# Patient Record
Sex: MALE | Race: WHITE | Employment: OTHER | URBAN - METROPOLITAN AREA
[De-identification: names, ages, dates, MRNs, and addresses within clinical notes are randomized per-mention and may not be internally consistent; named-entity substitution may affect disease eponyms.]

---

## 2023-10-12 ENCOUNTER — APPOINTMENT (OUTPATIENT)
Dept: PHARMACY | Facility: CLINIC | Age: 78
End: 2023-10-12
Payer: MEDICARE

## 2023-10-12 ENCOUNTER — ANTICOAGULATION - WARFARIN VISIT (OUTPATIENT)
Dept: PHARMACY | Facility: CLINIC | Age: 78
End: 2023-10-12
Payer: MEDICARE

## 2023-10-12 DIAGNOSIS — Z95.2 HISTORY OF PROSTHETIC HEART VALVE: Primary | ICD-10-CM

## 2023-10-12 LAB
POC INR: 3.8
POC PROTHROMBIN TIME: NORMAL

## 2023-10-12 PROCEDURE — 85610 PROTHROMBIN TIME: CPT | Performed by: PHARMACIST

## 2023-10-12 RX ORDER — FENOFIBRATE 160 MG/1
TABLET ORAL
COMMUNITY
Start: 2023-07-31

## 2023-10-12 RX ORDER — METFORMIN HYDROCHLORIDE 500 MG/1
TABLET ORAL
COMMUNITY
Start: 2023-10-08

## 2023-10-12 RX ORDER — LANCETS 33 GAUGE
EACH MISCELLANEOUS
COMMUNITY
Start: 2023-09-07

## 2023-10-12 RX ORDER — BLOOD-GLUCOSE METER
EACH MISCELLANEOUS
COMMUNITY
Start: 2023-09-07

## 2023-10-12 RX ORDER — MIRTAZAPINE 15 MG/1
TABLET, FILM COATED ORAL
COMMUNITY
Start: 2023-10-08

## 2023-10-12 RX ORDER — LOSARTAN POTASSIUM 50 MG/1
TABLET ORAL
COMMUNITY
Start: 2023-10-02

## 2023-10-12 RX ORDER — WARFARIN SODIUM 5 MG/1
TABLET ORAL
COMMUNITY
Start: 2023-08-27

## 2023-10-12 RX ORDER — ATORVASTATIN CALCIUM 20 MG/1
TABLET, FILM COATED ORAL
COMMUNITY
Start: 2023-08-27

## 2023-10-12 RX ORDER — GLIMEPIRIDE 2 MG/1
TABLET ORAL
COMMUNITY
Start: 2023-08-27

## 2023-10-12 RX ORDER — METOPROLOL TARTRATE 100 MG/1
TABLET ORAL
COMMUNITY
Start: 2023-08-27

## 2023-10-12 RX ORDER — ATORVASTATIN CALCIUM 10 MG/1
TABLET, FILM COATED ORAL
COMMUNITY
Start: 2022-11-27

## 2023-10-12 NOTE — PROGRESS NOTES
Coumadin Clinic Visit Note    Patient verified warfarin dose  No missed doses  No unusual bruising or bleeding  No changes to medications  Consistent dietary green intake  No anticipated procedures at this time  INR Supratherapeutic today at 3.8  Had less greens this week.  Pt took dose today  Take only 5mg tomorrow  Next appointment 4 weeks      Juana Crowley, EstelleD

## 2023-11-09 ENCOUNTER — ANTICOAGULATION - WARFARIN VISIT (OUTPATIENT)
Dept: PHARMACY | Facility: CLINIC | Age: 78
End: 2023-11-09
Payer: MEDICARE

## 2023-11-09 DIAGNOSIS — Z95.2 HISTORY OF PROSTHETIC HEART VALVE: Primary | ICD-10-CM

## 2023-11-09 LAB
POC INR: 2.7
POC PROTHROMBIN TIME: NORMAL

## 2023-11-09 PROCEDURE — 85610 PROTHROMBIN TIME: CPT | Performed by: PHARMACIST

## 2023-11-09 NOTE — PROGRESS NOTES
Verified current dose with pt.  No new meds or med changes since last visit.  Pt denies unusual bleed/bruise.  No upcoming procedures.  Inr = 2.7  Continue same dose and check again in 5 weeks.

## 2023-12-14 ENCOUNTER — ANTICOAGULATION - WARFARIN VISIT (OUTPATIENT)
Dept: PHARMACY | Facility: CLINIC | Age: 78
End: 2023-12-14
Payer: MEDICARE

## 2023-12-14 DIAGNOSIS — Z95.2 HISTORY OF PROSTHETIC HEART VALVE: Primary | ICD-10-CM

## 2023-12-14 LAB
POC INR: 3.5
POC PROTHROMBIN TIME: NORMAL

## 2023-12-14 PROCEDURE — 85610 PROTHROMBIN TIME: CPT | Mod: QW | Performed by: PHARMACIST

## 2023-12-14 PROCEDURE — 99212 OFFICE O/P EST SF 10 MIN: CPT | Performed by: PHARMACIST

## 2023-12-14 NOTE — PROGRESS NOTES
Verified current dose with pt.    No new meds or med changes since last visit.  Pt denies unusual bleed/bruise.  No upcoming procedures.  Inr = 3.5  Continue same dose and check again in 5 weeks.

## 2023-12-18 ENCOUNTER — LAB (OUTPATIENT)
Dept: LAB | Facility: LAB | Age: 78
End: 2023-12-18
Payer: MEDICARE

## 2023-12-18 DIAGNOSIS — E78.5 HYPERLIPIDEMIA, UNSPECIFIED: Primary | ICD-10-CM

## 2023-12-18 LAB
CHOLEST SERPL-MCNC: 158 MG/DL (ref 0–199)
CHOLESTEROL/HDL RATIO: 5
HDLC SERPL-MCNC: 31.3 MG/DL
LDLC SERPL CALC-MCNC: 77 MG/DL
NON HDL CHOLESTEROL: 127 MG/DL (ref 0–149)
TRIGL SERPL-MCNC: 248 MG/DL (ref 0–149)
VLDL: 50 MG/DL (ref 0–40)

## 2023-12-18 PROCEDURE — 36415 COLL VENOUS BLD VENIPUNCTURE: CPT

## 2023-12-18 PROCEDURE — 80061 LIPID PANEL: CPT

## 2024-01-18 ENCOUNTER — ANTICOAGULATION - WARFARIN VISIT (OUTPATIENT)
Dept: PHARMACY | Facility: CLINIC | Age: 79
End: 2024-01-18
Payer: MEDICARE

## 2024-01-18 ENCOUNTER — CLINICAL SUPPORT (OUTPATIENT)
Dept: PHARMACY | Facility: CLINIC | Age: 79
End: 2024-01-18
Payer: MEDICARE

## 2024-01-18 DIAGNOSIS — Z95.2 HISTORY OF PROSTHETIC HEART VALVE: Primary | ICD-10-CM

## 2024-01-18 LAB
POC INR: 4.1
POC PROTHROMBIN TIME: NORMAL

## 2024-01-18 PROCEDURE — 85610 PROTHROMBIN TIME: CPT | Mod: QW | Performed by: PHARMACIST

## 2024-01-18 PROCEDURE — 99212 OFFICE O/P EST SF 10 MIN: CPT | Performed by: PHARMACIST

## 2024-01-18 NOTE — PROGRESS NOTES
Coumadin Clinic Visit Note    Patient verified warfarin dose  No missed doses  No unusual bruising or bleeding  No changes to medications  Consistent dietary green intake  No anticipated procedures at this time  INR Supratherapeutic today at 4.1  Hold warfarin today  Pt did not have enough greens lately.  Will have more now.  Next appointment 1 week.      Juana Crowley, PharmD

## 2024-01-25 ENCOUNTER — ANTICOAGULATION - WARFARIN VISIT (OUTPATIENT)
Dept: PHARMACY | Facility: CLINIC | Age: 79
End: 2024-01-25
Payer: MEDICARE

## 2024-01-25 ENCOUNTER — CLINICAL SUPPORT (OUTPATIENT)
Dept: PHARMACY | Facility: CLINIC | Age: 79
End: 2024-01-25
Payer: MEDICARE

## 2024-01-25 DIAGNOSIS — Z95.2 HISTORY OF PROSTHETIC HEART VALVE: Primary | ICD-10-CM

## 2024-01-25 LAB
POC INR: 2.6
POC INR: 2.6
POC PROTHROMBIN TIME: NORMAL
POC PROTHROMBIN TIME: NORMAL

## 2024-01-25 PROCEDURE — 99212 OFFICE O/P EST SF 10 MIN: CPT | Performed by: PHARMACIST

## 2024-01-25 PROCEDURE — 85610 PROTHROMBIN TIME: CPT | Mod: QW | Performed by: PHARMACIST

## 2024-01-25 NOTE — PROGRESS NOTES
Robinson Hetlon is a 78 y.o. male with history of History of prosthetic heart valve  who presents today for anticoagulation monitoring and adjustment.  INR 2.6 is therapeutic for this patient (goal range 2.5-3.5) and is reflective of 37.5 mg TWD for 1 week due to high INR last week  Patient verifies current dosing regimen, patient able to verbally recall dose  Patient reports no  missed doses since last INR  Last INR 4.1 on 1/18/24 (1 week interval)  Patient denies s/sx clotting and/or stroke  Patient denies hematuria, epistaxis, rectal bleeding  Patient denies changes in diet, alcohol, or tobacco use  Vegetable intake consistent from week to week  Reviewed medication list and drug allergies with patient, updated any medication additions or modifications accordingly  Acetaminophen intake: no changes   Patient also denies any pending medical or dental procedures scheduled at this time  Patient was instructed to continue current TWD of 40mg  and RTC 5 weeks    Lindsay Gillis, EstelleD, BCPS   1/25/2024 10:55 AM

## 2024-02-29 ENCOUNTER — ANTICOAGULATION - WARFARIN VISIT (OUTPATIENT)
Dept: PHARMACY | Facility: CLINIC | Age: 79
End: 2024-02-29
Payer: MEDICARE

## 2024-02-29 ENCOUNTER — CLINICAL SUPPORT (OUTPATIENT)
Dept: PHARMACY | Facility: CLINIC | Age: 79
End: 2024-02-29
Payer: MEDICARE

## 2024-02-29 DIAGNOSIS — Z95.2 HISTORY OF PROSTHETIC HEART VALVE: Primary | ICD-10-CM

## 2024-02-29 LAB
POC INR: 3.3
POC PROTHROMBIN TIME: NORMAL

## 2024-02-29 PROCEDURE — 99212 OFFICE O/P EST SF 10 MIN: CPT | Performed by: PHARMACIST

## 2024-02-29 PROCEDURE — 85610 PROTHROMBIN TIME: CPT | Mod: QW | Performed by: PHARMACIST

## 2024-02-29 NOTE — PROGRESS NOTES
Verified current dose with pt.    No new meds or med changes since last visit.  Pt denies unusual bleed/bruise.  No upcoming procedures.  Inr = 3.3  Continue same dose and check again in 5 weeks.

## 2024-04-04 ENCOUNTER — ANTICOAGULATION - WARFARIN VISIT (OUTPATIENT)
Dept: PHARMACY | Facility: CLINIC | Age: 79
End: 2024-04-04
Payer: MEDICARE

## 2024-04-04 DIAGNOSIS — Z95.2 HISTORY OF PROSTHETIC HEART VALVE: Primary | ICD-10-CM

## 2024-04-04 LAB
POC INR: 2.8
POC PROTHROMBIN TIME: NORMAL

## 2024-04-04 PROCEDURE — 85610 PROTHROMBIN TIME: CPT | Mod: QW

## 2024-04-04 PROCEDURE — 99212 OFFICE O/P EST SF 10 MIN: CPT

## 2024-04-04 NOTE — PROGRESS NOTES
Coumadin Clinic Visit Note    Patient verified warfarin dose  No missed doses  No unusual bruising or bleeding  No changes to medications  Patient did start taking some Vitamin C but started a few months ago  Consistent dietary green intake  No anticipated procedures at this time  Patient has INR range to 2.5 to 3.5  INR Therapeutic today at 2.8  No changes to warfarin dose today  Next appointment 5 weeks    Dulce Maria Patterson, Pharm D

## 2024-05-09 ENCOUNTER — ANTICOAGULATION - WARFARIN VISIT (OUTPATIENT)
Dept: PHARMACY | Facility: CLINIC | Age: 79
End: 2024-05-09
Payer: MEDICARE

## 2024-05-09 DIAGNOSIS — Z95.2 HISTORY OF PROSTHETIC HEART VALVE: Primary | ICD-10-CM

## 2024-05-09 LAB
POC INR: 2.8
POC PROTHROMBIN TIME: NORMAL

## 2024-05-09 PROCEDURE — 99212 OFFICE O/P EST SF 10 MIN: CPT

## 2024-05-09 PROCEDURE — 85610 PROTHROMBIN TIME: CPT | Mod: QW

## 2024-05-09 NOTE — PROGRESS NOTES
No bleeding or unusual bruising.  Medications and doses verified.  No scheduled procedures at this time.  INR=2.8   Plan: Continue same weekly dose.  Follow up INR check in 5 weeks.

## 2024-06-04 ENCOUNTER — LAB (OUTPATIENT)
Dept: LAB | Facility: LAB | Age: 79
End: 2024-06-04
Payer: MEDICARE

## 2024-06-04 DIAGNOSIS — R31.9 HEMATURIA, UNSPECIFIED: Primary | ICD-10-CM

## 2024-06-04 LAB
ANION GAP SERPL CALC-SCNC: 11 MMOL/L (ref 10–20)
BASOPHILS # BLD AUTO: 0.07 X10*3/UL (ref 0–0.1)
BASOPHILS NFR BLD AUTO: 1.4 %
BUN SERPL-MCNC: 29 MG/DL (ref 6–23)
CALCIUM SERPL-MCNC: 9.8 MG/DL (ref 8.6–10.3)
CHLORIDE SERPL-SCNC: 105 MMOL/L (ref 98–107)
CO2 SERPL-SCNC: 29 MMOL/L (ref 21–32)
CREAT SERPL-MCNC: 1.18 MG/DL (ref 0.5–1.3)
EGFRCR SERPLBLD CKD-EPI 2021: 64 ML/MIN/1.73M*2
EOSINOPHIL # BLD AUTO: 0.14 X10*3/UL (ref 0–0.4)
EOSINOPHIL NFR BLD AUTO: 2.7 %
ERYTHROCYTE [DISTWIDTH] IN BLOOD BY AUTOMATED COUNT: 12.8 % (ref 11.5–14.5)
GLUCOSE SERPL-MCNC: 213 MG/DL (ref 74–99)
HCT VFR BLD AUTO: 45.3 % (ref 41–52)
HGB BLD-MCNC: 15.4 G/DL (ref 13.5–17.5)
IMM GRANULOCYTES # BLD AUTO: 0.02 X10*3/UL (ref 0–0.5)
IMM GRANULOCYTES NFR BLD AUTO: 0.4 % (ref 0–0.9)
LYMPHOCYTES # BLD AUTO: 1.52 X10*3/UL (ref 0.8–3)
LYMPHOCYTES NFR BLD AUTO: 29.8 %
MCH RBC QN AUTO: 33.2 PG (ref 26–34)
MCHC RBC AUTO-ENTMCNC: 34 G/DL (ref 32–36)
MCV RBC AUTO: 98 FL (ref 80–100)
MONOCYTES # BLD AUTO: 0.39 X10*3/UL (ref 0.05–0.8)
MONOCYTES NFR BLD AUTO: 7.6 %
NEUTROPHILS # BLD AUTO: 2.96 X10*3/UL (ref 1.6–5.5)
NEUTROPHILS NFR BLD AUTO: 58.1 %
NRBC BLD-RTO: 0 /100 WBCS (ref 0–0)
PLATELET # BLD AUTO: 222 X10*3/UL (ref 150–450)
POTASSIUM SERPL-SCNC: 4.8 MMOL/L (ref 3.5–5.3)
RBC # BLD AUTO: 4.64 X10*6/UL (ref 4.5–5.9)
SODIUM SERPL-SCNC: 140 MMOL/L (ref 136–145)
WBC # BLD AUTO: 5.1 X10*3/UL (ref 4.4–11.3)

## 2024-06-04 PROCEDURE — 36415 COLL VENOUS BLD VENIPUNCTURE: CPT

## 2024-06-04 PROCEDURE — 80048 BASIC METABOLIC PNL TOTAL CA: CPT

## 2024-06-04 PROCEDURE — 85025 COMPLETE CBC W/AUTO DIFF WBC: CPT

## 2024-06-06 ENCOUNTER — HOSPITAL ENCOUNTER (OUTPATIENT)
Dept: RADIOLOGY | Facility: CLINIC | Age: 79
Discharge: HOME | End: 2024-06-06
Payer: MEDICARE

## 2024-06-06 DIAGNOSIS — R31.9 HEMATURIA: ICD-10-CM

## 2024-06-06 PROCEDURE — 2550000001 HC RX 255 CONTRASTS: Performed by: INTERNAL MEDICINE

## 2024-06-06 PROCEDURE — 76377 3D RENDER W/INTRP POSTPROCES: CPT

## 2024-06-06 PROCEDURE — 74178 CT ABD&PLV WO CNTR FLWD CNTR: CPT | Performed by: RADIOLOGY

## 2024-06-06 PROCEDURE — 76376 3D RENDER W/INTRP POSTPROCES: CPT | Performed by: RADIOLOGY

## 2024-06-06 RX ADMIN — IOHEXOL 75 ML: 350 INJECTION, SOLUTION INTRAVENOUS at 10:21

## 2024-06-13 ENCOUNTER — ANTICOAGULATION - WARFARIN VISIT (OUTPATIENT)
Dept: PHARMACY | Facility: CLINIC | Age: 79
End: 2024-06-13
Payer: MEDICARE

## 2024-06-13 ENCOUNTER — TELEPHONE (OUTPATIENT)
Dept: SCHEDULING | Age: 79
End: 2024-06-13

## 2024-06-13 DIAGNOSIS — Z95.2 HISTORY OF PROSTHETIC HEART VALVE: Primary | ICD-10-CM

## 2024-06-13 LAB
POC INR: 3.4
POC PROTHROMBIN TIME: NORMAL

## 2024-06-13 PROCEDURE — 99212 OFFICE O/P EST SF 10 MIN: CPT | Performed by: PHARMACIST

## 2024-06-13 PROCEDURE — 85610 PROTHROMBIN TIME: CPT | Mod: QW | Performed by: PHARMACIST

## 2024-06-13 NOTE — PROGRESS NOTES
Robinson Helton is a 78 y.o. male with history of prosthetic heart valve who presents today for anticoagulation monitoring and adjustment.  INR 3.4 is therapeutic for this patient (goal range 2.5-3.5) and is reflective of 40 mg TWD  Patient verifies current dosing regimen, patient able to verbally recall dose  Patient reports 0  missed doses since last INR  Last INR 2.8 on 5/9/24 (5 week interval)  Patient denies s/sx clotting and/or stroke  Patient denies hematuria, epistaxis, rectal bleeding  Patient denies changes in diet, alcohol, or tobacco use  Vegetable intake consistent from week to week  Reviewed medication list and drug allergies with patient, updated any medication additions or modifications accordingly  Patient noted some blood in urine - he is following with urology and had an MRI with Dr. Horan -- nothing conclusive as of yet   Acetaminophen intake: no changes   Patient also denies any pending medical or dental procedures scheduled at this time  Patient was instructed to continue with warfarin 40mg TWD and RTC 5 weeks    Lindsay Gillis, PharmD, BCPS   6/13/2024 10:35 AM

## 2024-07-18 ENCOUNTER — ANTICOAGULATION - WARFARIN VISIT (OUTPATIENT)
Dept: PHARMACY | Facility: CLINIC | Age: 79
End: 2024-07-18
Payer: MEDICARE

## 2024-07-18 DIAGNOSIS — Z95.2 HISTORY OF PROSTHETIC HEART VALVE: Primary | ICD-10-CM

## 2024-07-18 LAB
POC INR: 3.1
POC PROTHROMBIN TIME: NORMAL

## 2024-07-18 PROCEDURE — 85610 PROTHROMBIN TIME: CPT | Mod: QW | Performed by: PHARMACIST

## 2024-07-18 PROCEDURE — 99212 OFFICE O/P EST SF 10 MIN: CPT | Performed by: PHARMACIST

## 2024-07-18 NOTE — PROGRESS NOTES
Verified current dose with pt.    No new meds or med changes since last visit.  Pt denies unusual bleed/bruise.  No upcoming procedures.  No missed doses per pt.    Inr = 3.1  Continue same dose and check again in 5 weeks.

## 2024-07-19 ENCOUNTER — APPOINTMENT (OUTPATIENT)
Dept: HEMATOLOGY/ONCOLOGY | Facility: CLINIC | Age: 79
End: 2024-07-19
Payer: MEDICARE

## 2024-07-26 ENCOUNTER — OFFICE VISIT (OUTPATIENT)
Dept: HEMATOLOGY/ONCOLOGY | Facility: CLINIC | Age: 79
End: 2024-07-26
Payer: MEDICARE

## 2024-07-26 ENCOUNTER — APPOINTMENT (OUTPATIENT)
Dept: LAB | Facility: CLINIC | Age: 79
End: 2024-07-26
Payer: MEDICARE

## 2024-07-26 ENCOUNTER — LAB (OUTPATIENT)
Dept: LAB | Facility: CLINIC | Age: 79
End: 2024-07-26
Payer: MEDICARE

## 2024-07-26 VITALS
DIASTOLIC BLOOD PRESSURE: 63 MMHG | HEART RATE: 54 BPM | WEIGHT: 199.63 LBS | TEMPERATURE: 95.5 F | OXYGEN SATURATION: 97 % | RESPIRATION RATE: 20 BRPM | BODY MASS INDEX: 28.9 KG/M2 | SYSTOLIC BLOOD PRESSURE: 129 MMHG

## 2024-07-26 DIAGNOSIS — C49.3: ICD-10-CM

## 2024-07-26 LAB
ALBUMIN SERPL BCP-MCNC: 4.2 G/DL (ref 3.4–5)
ALP SERPL-CCNC: 33 U/L (ref 33–136)
ALT SERPL W P-5'-P-CCNC: 22 U/L (ref 10–52)
ANION GAP SERPL CALC-SCNC: 12 MMOL/L (ref 10–20)
AST SERPL W P-5'-P-CCNC: 32 U/L (ref 9–39)
BASOPHILS # BLD AUTO: 0.06 X10*3/UL (ref 0–0.1)
BASOPHILS NFR BLD AUTO: 1.3 %
BILIRUB SERPL-MCNC: 0.8 MG/DL (ref 0–1.2)
BUN SERPL-MCNC: 26 MG/DL (ref 6–23)
CALCIUM SERPL-MCNC: 9 MG/DL (ref 8.6–10.3)
CHLORIDE SERPL-SCNC: 105 MMOL/L (ref 98–107)
CO2 SERPL-SCNC: 26 MMOL/L (ref 21–32)
CREAT SERPL-MCNC: 0.91 MG/DL (ref 0.5–1.3)
EGFRCR SERPLBLD CKD-EPI 2021: 87 ML/MIN/1.73M*2
EOSINOPHIL # BLD AUTO: 0.16 X10*3/UL (ref 0–0.4)
EOSINOPHIL NFR BLD AUTO: 3.5 %
ERYTHROCYTE [DISTWIDTH] IN BLOOD BY AUTOMATED COUNT: 12.5 % (ref 11.5–14.5)
GLUCOSE SERPL-MCNC: 190 MG/DL (ref 74–99)
HCT VFR BLD AUTO: 43.8 % (ref 41–52)
HGB BLD-MCNC: 15.1 G/DL (ref 13.5–17.5)
IMM GRANULOCYTES # BLD AUTO: 0.01 X10*3/UL (ref 0–0.5)
IMM GRANULOCYTES NFR BLD AUTO: 0.2 % (ref 0–0.9)
LDH SERPL L TO P-CCNC: 174 U/L (ref 84–246)
LYMPHOCYTES # BLD AUTO: 1.47 X10*3/UL (ref 0.8–3)
LYMPHOCYTES NFR BLD AUTO: 32.3 %
MCH RBC QN AUTO: 33.1 PG (ref 26–34)
MCHC RBC AUTO-ENTMCNC: 34.5 G/DL (ref 32–36)
MCV RBC AUTO: 96 FL (ref 80–100)
MONOCYTES # BLD AUTO: 0.36 X10*3/UL (ref 0.05–0.8)
MONOCYTES NFR BLD AUTO: 7.9 %
NEUTROPHILS # BLD AUTO: 2.49 X10*3/UL (ref 1.6–5.5)
NEUTROPHILS NFR BLD AUTO: 54.8 %
NRBC BLD-RTO: 0 /100 WBCS (ref 0–0)
PLATELET # BLD AUTO: 185 X10*3/UL (ref 150–450)
POTASSIUM SERPL-SCNC: 4.1 MMOL/L (ref 3.5–5.3)
PROT SERPL-MCNC: 6.5 G/DL (ref 6.4–8.2)
RBC # BLD AUTO: 4.56 X10*6/UL (ref 4.5–5.9)
SODIUM SERPL-SCNC: 139 MMOL/L (ref 136–145)
WBC # BLD AUTO: 4.6 X10*3/UL (ref 4.4–11.3)

## 2024-07-26 PROCEDURE — 83615 LACTATE (LD) (LDH) ENZYME: CPT | Performed by: INTERNAL MEDICINE

## 2024-07-26 PROCEDURE — 80053 COMPREHEN METABOLIC PANEL: CPT | Performed by: INTERNAL MEDICINE

## 2024-07-26 PROCEDURE — 1159F MED LIST DOCD IN RCRD: CPT | Performed by: INTERNAL MEDICINE

## 2024-07-26 PROCEDURE — 36415 COLL VENOUS BLD VENIPUNCTURE: CPT

## 2024-07-26 PROCEDURE — 85025 COMPLETE CBC W/AUTO DIFF WBC: CPT | Performed by: INTERNAL MEDICINE

## 2024-07-26 PROCEDURE — 1126F AMNT PAIN NOTED NONE PRSNT: CPT | Performed by: INTERNAL MEDICINE

## 2024-07-26 PROCEDURE — 1036F TOBACCO NON-USER: CPT | Performed by: INTERNAL MEDICINE

## 2024-07-26 PROCEDURE — 99213 OFFICE O/P EST LOW 20 MIN: CPT | Performed by: INTERNAL MEDICINE

## 2024-07-26 RX ORDER — ASPIRIN 81 MG/1
81 TABLET ORAL DAILY
COMMUNITY

## 2024-07-26 ASSESSMENT — ENCOUNTER SYMPTOMS
OCCASIONAL FEELINGS OF UNSTEADINESS: 0
LOSS OF SENSATION IN FEET: 0
DEPRESSION: 0

## 2024-07-26 ASSESSMENT — PAIN SCALES - GENERAL: PAINLEVEL: 0-NO PAIN

## 2024-07-26 NOTE — PROGRESS NOTES
LOCATION:  [facility] at [facility].    HEMATOLOGY ONCOLOGY PROBLEMS:    1.  History of liposarcoma involving the left  chest wall.      a.  Status post surgical resection by Dr. Declan Hernandez at [facility] in Aug 2012.      b.  Subsequent further resection of a residual 11 cm sarcoma by Dr. Stoney Gray at [facility].       c.  No history of chemotherapy or radiation therapy.      d.  Currently being followed with close observation.    CHIEF COMPLAINT:     The patient is in the clinic for management of liposarcoma involving the left upper chest wall.    HISTORY:    Mr. 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  The patient has a history of CABG and aorta reconstruction and the aortic valve replacement surgery in 2003.  He was having  regular followup, and during one of the scans, was noted to have a chest wall lesion around 2012.  He had a surgical resection done initially at [facility] by Dr. Declan Hernandez in August 2012 with pathology results confirming liposarcoma.  I do  not have all the details, but as per the patient, there was a finding of a residual, large, 11 cm lesion in the latissimus dorsi muscle.  He subsequently had an additional surgery done by Dr. Stoney Gray at [facility] with removal of 2 ribs  and a portion of the diaphragm and the latissimus dorsi muscle and other tissue.  He never received adjuvant radiation or chemotherapy, and since then, has been followed closely without any evidence of disease recurrence.  Over the years, he has developed  a large incisional/upper abdomen/flank hernia.  As per the patient, he has been advised close symptomatic followup.  He uses a brace regularly.    INTERVAL HISTORY:    He denies any specific new complaints.  No specific history of nausea, vomiting, fever, diarrhea, rash,  anorexia, or weight loss.     PAST MEDICAL  HISTORY:    1.  Coronary artery disease.  2.  Hypertension.  3.  History  of CABG/aortic valve replacement / aortic root reconstruction surgery in .  4.  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   5.  Anxiety/depression.  6.  Diabetes Mellitus type II.  7.  Remote history of tonsillectomy.    SOCIAL HISTORY:    Single and lives alone in [place].  Quit smoking 35 years ago.  Quit  drinking alcohol in .  He is a retired  and used to work for [employer].  Born and raised in [place].    FAMILY HISTORY:     Father  at age 65 from myocardial infarction.  Mother  at age 46 from throat cancer.  The sister  at age 67 from ovarian cancer. He doesn't have any children.     REVIEW OF SYSTEMS:    Pertinent finding as per the history above.   All other systems have been reviewed and generally negative and noncontributory.                                                                     ALLERGY & MEDICATIONS:  Allergies and latest outpatient medications list were reviewed in the EMR.    VITAL SIGNS  BSA: 2.11 meters squared  /63   Pulse 54   Temp 35.3 °C (95.5 °F) (Temporal)   Resp 20   Wt 90.5 kg (199 lb 10 oz)   SpO2 97%   BMI 28.90 kg/m²     PHYSICAL EXAMINATION:  Detailed examination not done.  Large left-sided hernia as before.    LAB RESULTS:  CBC, CMP and LDH were all normal today other than glucose of 190.  Last 3 sets of blood work were reviewed and the trend was noted.     ASSESSMENT & PLAN:  1.  History of localized chest wall liposarcoma. Please refer to the details of initial presentation and management as outlined above. In summary, the patient was noted to have  an incidental finding of a chest wall lesion. He had an initial resection done at [place] but had extensive additional surgery done at [facility] in . There was no need for a adjuvant radiation or chemotherapy, and since then, he has been followed  closely without any evidence of  recurrence. Over the years, he has developed a large left flank hernia extending almost up to the lower axilla.  He is 7 years out from his initial diagnosis, and there is no need for regular followup CT scans at this time.  In the future, if there are any new symptoms or concerns we will check the scans at that time.    His blood work is essentially unremarkable. Patient is reassured that everything is stable. We will continue to follow him closely with regular physical examination  and blood work.    2.  Left flank hernia.  As stated in the previous notes, on physical examination, the patient has a large hernia. As per the patient, the finding has not changed over the years. As before, I am slightly concerned at the size and extent of the hernia. In my opinion, there will be a concern for strangulation or volvulus  or similar complications.  I again advised him to have surgical reevaluation but he is reluctant. As per the patient he is used to this large swelling and he has not noticed any significant change over last 10 years and wants to avoid another big  surgery.    3.  Followup. He will have a followup visit in 12 months. We will check labs (CBC/ CMP)  few days prior to next visit.     This note has been transcribed using Dragon voice recognition system and there is a possibility of unintentional typing misprints.

## 2024-08-16 ENCOUNTER — APPOINTMENT (OUTPATIENT)
Dept: UROLOGY | Facility: CLINIC | Age: 79
End: 2024-08-16
Payer: MEDICARE

## 2024-08-16 VITALS
TEMPERATURE: 97 F | DIASTOLIC BLOOD PRESSURE: 74 MMHG | BODY MASS INDEX: 29.12 KG/M2 | HEART RATE: 61 BPM | SYSTOLIC BLOOD PRESSURE: 148 MMHG | WEIGHT: 196.6 LBS | HEIGHT: 69 IN

## 2024-08-16 DIAGNOSIS — R31.0 GROSS HEMATURIA: Primary | ICD-10-CM

## 2024-08-16 PROCEDURE — 1159F MED LIST DOCD IN RCRD: CPT | Performed by: STUDENT IN AN ORGANIZED HEALTH CARE EDUCATION/TRAINING PROGRAM

## 2024-08-16 PROCEDURE — 1036F TOBACCO NON-USER: CPT | Performed by: STUDENT IN AN ORGANIZED HEALTH CARE EDUCATION/TRAINING PROGRAM

## 2024-08-16 PROCEDURE — 99204 OFFICE O/P NEW MOD 45 MIN: CPT | Performed by: STUDENT IN AN ORGANIZED HEALTH CARE EDUCATION/TRAINING PROGRAM

## 2024-08-16 NOTE — ASSESSMENT & PLAN NOTE
Discussed with the patient that anyone with gross or visible blood in the urine or blood demonstrated on microscopic analysis is referred to Urology for evaluation.  Most of the time, the evaluation does not find a cause, but we do the evaluation to rule out significant causes of blood in the urine including stones, kidney cancer, bladder cancer, UTI, and numerous other conditions.  The evaluation includes an upper tract evaluation which normally is cross sectional imaging of the kidneys and ureters (US, CT or MRI) as well cystoscopy of the lower tract to rule out bladder/urethra pathology.  Additional tests such as urine studies, PSA assessment in males may be ordered as well.

## 2024-08-16 NOTE — PROGRESS NOTES
Subjective   Patient ID: Robinson Helton is a 79 y.o. male who presents for today for gross hematuria in 06/2024.      Experienced gross hematuria in June of 2024. States that it has not happened again but his urine will appear orange sometimes.  He is on warfarin.    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  Scan also showed a kidney stone in the right kidney.     Patient is currently on Warfarin 5mg tablet.     PMHx:  He is on warfarin for a heart valve.  He has a history of liposarcoma and has had surgery in the past.  He has HTN and DM    PSHx:  History of liposarcoma surgery and aortic valve replaced      Social: Tobacco Use: Medium Risk (7/26/2024)      Patient History          Smoking Tobacco Use: Former stopped 5+ years ago. Pack a day.           Smokeless Tobacco Use: Never          Passive Exposure: Past      Family: Cancer-related family history is not on file.               Review of Systems    All systems were reviewed. Anything negative was noted in the HPI.    Objective   Physical Exam  Physical Exam  Gen: No acute distress     Psych: Alert and oriented x3     Neuro:  Normal ROM    Resp: Nonlabored respirations     CV: Regular rate and rhythm     Abd: S, NT, ND.     : Deferred    Skin: Warm, dry and intact without rashes     Lymphatics: No peripheral edema         Assessment/Plan   Problem List Items Addressed This Visit       Gross hematuria - Primary    Current Assessment & Plan     Discussed with the patient that anyone with gross or visible blood in the urine or blood demonstrated on microscopic analysis is referred to Urology for evaluation.  Most of the time, the evaluation does not find a cause, but we do the evaluation to rule out significant causes of blood in the urine including stones, kidney cancer, bladder cancer, UTI, and numerous other conditions.  The evaluation includes an upper tract evaluation which normally is cross sectional imaging of  the kidneys and ureters (US, CT or MRI) as well cystoscopy of the lower tract to rule out bladder/urethra pathology.  Additional tests such as urine studies, PSA assessment in males may be ordered as well.              Reviewed CT Urogram. Will require CT scans in 1 year for Bosniak IIF.  Will require cystoscopy and cytology at next available visit to complete gross hematuria evaluation.         Plan:  Follow up in one year and repeat CT scan of the kidneys.  Order for a cystoscopy.           Scribe Attestation  By signing my name below, I, Ventura Arnold   attest that this documentation has been prepared under the direction and in the presence of Scott Calderón MD MPH

## 2024-08-20 ENCOUNTER — ANTICOAGULATION - WARFARIN VISIT (OUTPATIENT)
Dept: PHARMACY | Facility: CLINIC | Age: 79
End: 2024-08-20
Payer: MEDICARE

## 2024-08-20 DIAGNOSIS — Z95.2 HISTORY OF PROSTHETIC HEART VALVE: Primary | ICD-10-CM

## 2024-08-20 LAB
POC INR: 2.6
POC PROTHROMBIN TIME: NORMAL

## 2024-08-20 PROCEDURE — 99212 OFFICE O/P EST SF 10 MIN: CPT | Performed by: PHARMACIST

## 2024-08-20 PROCEDURE — 85610 PROTHROMBIN TIME: CPT | Mod: QW | Performed by: PHARMACIST

## 2024-08-20 NOTE — PROGRESS NOTES
Verified current dose with pt.    No new meds or med changes since last visit.  Pt denies unusual bruise.  Pt had some blood in urine x 1, seeing urologist next week; Dr. Whitehead  Pt says more broccoli than usual - will cut back  Pt to have cystoscopy, but does not need to hold warfarin for this procedure.  He spoke with the doctor about this.    Inr = 2.6  Continue same dose and check again in 5 weeks.       Adequate: hears normal conversation without difficulty

## 2024-08-22 ENCOUNTER — APPOINTMENT (OUTPATIENT)
Dept: PHARMACY | Facility: CLINIC | Age: 79
End: 2024-08-22
Payer: MEDICARE

## 2024-09-18 DIAGNOSIS — Z95.2 AORTIC VALVE REPLACED: Primary | ICD-10-CM

## 2024-09-18 DIAGNOSIS — I48.0 PAROXYSMAL ATRIAL FIBRILLATION (MULTI): ICD-10-CM

## 2024-09-24 ENCOUNTER — ANTICOAGULATION - WARFARIN VISIT (OUTPATIENT)
Dept: PHARMACY | Facility: CLINIC | Age: 79
End: 2024-09-24
Payer: MEDICARE

## 2024-09-24 DIAGNOSIS — I48.0 PAROXYSMAL ATRIAL FIBRILLATION (MULTI): ICD-10-CM

## 2024-09-24 DIAGNOSIS — Z95.2 AORTIC VALVE REPLACED: ICD-10-CM

## 2024-09-24 DIAGNOSIS — Z95.2 HISTORY OF PROSTHETIC HEART VALVE: Primary | ICD-10-CM

## 2024-09-24 LAB
POC INR: 2.5
POC PROTHROMBIN TIME: NORMAL

## 2024-09-24 PROCEDURE — 85610 PROTHROMBIN TIME: CPT | Mod: QW

## 2024-09-24 PROCEDURE — 99212 OFFICE O/P EST SF 10 MIN: CPT

## 2024-09-24 NOTE — PROGRESS NOTES
No bleeding or unusual bruising.  Medications and doses verified.  No scheduled procedures at this time.  INR=2.5   Plan: Continue same weekly dose.  Follow up INR check in 5 weeks.

## 2024-10-11 ENCOUNTER — APPOINTMENT (OUTPATIENT)
Dept: UROLOGY | Facility: CLINIC | Age: 79
End: 2024-10-11
Payer: MEDICARE

## 2024-10-11 VITALS — HEART RATE: 67 BPM | SYSTOLIC BLOOD PRESSURE: 158 MMHG | DIASTOLIC BLOOD PRESSURE: 76 MMHG | TEMPERATURE: 97.3 F

## 2024-10-11 DIAGNOSIS — R31.0 GROSS HEMATURIA: ICD-10-CM

## 2024-10-11 LAB
POC APPEARANCE, URINE: ABNORMAL
POC BILIRUBIN, URINE: NEGATIVE
POC BLOOD, URINE: NEGATIVE
POC COLOR, URINE: YELLOW
POC GLUCOSE, URINE: NEGATIVE MG/DL
POC KETONES, URINE: NEGATIVE MG/DL
POC LEUKOCYTES, URINE: NEGATIVE
POC NITRITE,URINE: NEGATIVE
POC PH, URINE: 8 PH
POC PROTEIN, URINE: NEGATIVE MG/DL
POC SPECIFIC GRAVITY, URINE: 1.02
POC UROBILINOGEN, URINE: 1 EU/DL

## 2024-10-11 PROCEDURE — 52000 CYSTOURETHROSCOPY: CPT | Performed by: STUDENT IN AN ORGANIZED HEALTH CARE EDUCATION/TRAINING PROGRAM

## 2024-10-11 PROCEDURE — 81003 URINALYSIS AUTO W/O SCOPE: CPT | Performed by: STUDENT IN AN ORGANIZED HEALTH CARE EDUCATION/TRAINING PROGRAM

## 2024-10-11 NOTE — ASSESSMENT & PLAN NOTE
The patient tolerated the cystoscopy well today. We discussed that they may have hematuria after the procedure.     Cystoscopy showed bladder appear a little hard to see cause of urinary retention. No bladder stones, obstruction, or stricture seen. Prostate non obstructing.     Follow up in 1 year for the Bosniak IIF cyst with CT scan prior.    Orders:    POCT UA Automated manually resulted    CT kidney w and wo IV contrast; Future    Creatinine, Serum; Future

## 2024-10-11 NOTE — ASSESSMENT & PLAN NOTE
Bladder appear hard to see a cause of urinary retention. No bladder stones or obstruction or stricture seen. Prostate non obstructing.

## 2024-10-11 NOTE — PROGRESS NOTES
Subjective    Robinson Helton is a 79 y.o. male who presents for a cystoscopy procedure for hematuria.    Experienced gross hematuria in June of 2024. States that it has not happened again but his urine will appear orange sometimes.  He is on warfarin.     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  Scan also showed a kidney stone in the right kidney.      Patient is currently on Warfarin 5mg tablet.     He reports no hematuria.    He denies any urinary issues.    Review of Systems    All systems were reviewed. Anything negative was noted in the HPI.    Objective   Physical Exam  Gen: No acute distress      Psych: Alert and oriented x3      Neuro:  Normal ROM     Resp: Nonlabored respirations      CV: Regular rate and rhythm      Abd: S, NT, ND.     : Deferred     Skin: Warm, dry and intact without rashes      Lymphatics: No peripheral edema     Patient ID: Robinson Helton is a 79 y.o. male.    Cystoscopy    Date/Time: 10/11/2024 2:36 PM    Performed by: Scott Calderón MD MPH  Authorized by: Scott Calderón MD MPH    Procedure - Bladder Cystoscopy:     Procedure details: cystoscopy    Post-procedure:     Patient tolerance: Patient tolerated the procedure well with no immediate complications      Comments:      Bladder appear a little hard to see cause of urinary retention. No bladder stones, obstruction, or stricture seen. Prostate non obstructing.           No past medical history on file.      No past surgical history on file.      Assessment & Plan  Gross hematuria  The patient tolerated the cystoscopy well today. We discussed that they may have hematuria after the procedure.     Cystoscopy showed bladder appear a little hard to see cause of urinary retention. No bladder stones, obstruction, or stricture seen. Prostate non obstructing.     Follow up in 1 year for the Bosniak IIF cyst with CT scan prior.    Orders:    POCT UA Automated manually resulted    CT kidney w  and wo IV contrast; Future    Creatinine, Serum; Future                         Scribe Attestation  By signing my name below, I, Ventura Syed   attest that this documentation has been prepared under the direction and in the presence of Scott Calderón MD MPH

## 2024-10-17 ENCOUNTER — LAB (OUTPATIENT)
Dept: LAB | Facility: LAB | Age: 79
End: 2024-10-17
Payer: MEDICARE

## 2024-10-17 DIAGNOSIS — E11.9 TYPE 2 DIABETES MELLITUS WITHOUT COMPLICATIONS (MULTI): Primary | ICD-10-CM

## 2024-10-17 PROCEDURE — 83036 HEMOGLOBIN GLYCOSYLATED A1C: CPT

## 2024-10-17 PROCEDURE — 36415 COLL VENOUS BLD VENIPUNCTURE: CPT

## 2024-10-18 LAB
EST. AVERAGE GLUCOSE BLD GHB EST-MCNC: 154 MG/DL
HBA1C MFR BLD: 7 %

## 2024-10-29 ENCOUNTER — ANTICOAGULATION - WARFARIN VISIT (OUTPATIENT)
Dept: PHARMACY | Facility: CLINIC | Age: 79
End: 2024-10-29
Payer: MEDICARE

## 2024-10-29 DIAGNOSIS — Z95.2 AORTIC VALVE REPLACED: ICD-10-CM

## 2024-10-29 DIAGNOSIS — Z95.2 HISTORY OF PROSTHETIC HEART VALVE: Primary | ICD-10-CM

## 2024-10-29 DIAGNOSIS — I48.0 PAROXYSMAL ATRIAL FIBRILLATION (MULTI): ICD-10-CM

## 2024-10-29 LAB
POC INR: 3.4
POC PROTHROMBIN TIME: NORMAL

## 2024-10-29 PROCEDURE — 99212 OFFICE O/P EST SF 10 MIN: CPT

## 2024-10-29 PROCEDURE — 85610 PROTHROMBIN TIME: CPT | Mod: QW

## 2024-12-03 ENCOUNTER — ANTICOAGULATION - WARFARIN VISIT (OUTPATIENT)
Dept: PHARMACY | Facility: CLINIC | Age: 79
End: 2024-12-03
Payer: MEDICARE

## 2024-12-03 DIAGNOSIS — I48.0 PAROXYSMAL ATRIAL FIBRILLATION (MULTI): ICD-10-CM

## 2024-12-03 DIAGNOSIS — Z95.2 AORTIC VALVE REPLACED: ICD-10-CM

## 2024-12-03 DIAGNOSIS — Z95.2 HISTORY OF PROSTHETIC HEART VALVE: Primary | ICD-10-CM

## 2024-12-03 LAB
POC INR: 2.3
POC PROTHROMBIN TIME: NORMAL

## 2024-12-03 PROCEDURE — 99212 OFFICE O/P EST SF 10 MIN: CPT | Performed by: PHARMACIST

## 2024-12-03 PROCEDURE — 85610 PROTHROMBIN TIME: CPT | Mod: QW | Performed by: PHARMACIST

## 2024-12-03 NOTE — PROGRESS NOTES
Coumadin Clinic Visit Note    Patient verified warfarin dose  No missed doses  No unusual bruising or bleeding  No changes to medications  Consistent dietary green intake  No anticipated procedures at this time  INR Subtherapeutic today at 2.3, got some boost this month , so probably why a little low   No changes to warfarin dose today, patient took 1 and 1/2 tab today so explained he will take an extra 1/2 today to equal 10 mg instead of 7.5 mg , he understands , explained consistency about the boost , he will let us know next visit if he want to do it or not   Next appointment 5 weeks      Margoth Juárez, PharmD

## 2025-01-07 ENCOUNTER — ANTICOAGULATION - WARFARIN VISIT (OUTPATIENT)
Dept: PHARMACY | Facility: CLINIC | Age: 80
End: 2025-01-07
Payer: MEDICARE

## 2025-01-07 DIAGNOSIS — I48.0 PAROXYSMAL ATRIAL FIBRILLATION (MULTI): ICD-10-CM

## 2025-01-07 DIAGNOSIS — Z95.2 AORTIC VALVE REPLACED: ICD-10-CM

## 2025-01-07 DIAGNOSIS — Z95.2 HISTORY OF PROSTHETIC HEART VALVE: Primary | ICD-10-CM

## 2025-01-07 LAB
POC INR: 2.1
POC PROTHROMBIN TIME: NORMAL

## 2025-01-07 PROCEDURE — 99212 OFFICE O/P EST SF 10 MIN: CPT | Performed by: PHARMACIST

## 2025-01-07 PROCEDURE — 85610 PROTHROMBIN TIME: CPT | Mod: QW | Performed by: PHARMACIST

## 2025-01-07 NOTE — PROGRESS NOTES
Verified current dose with pt.    Pt is drinking 1/2 bottle of Boost every day (since his last visit)  No new meds or med changes since last visit.  Pt denies unusual bleed/bruise.  No missed doses  No diet changes  No upcoming procedures.  Inr = 2.1  Likely d/t boost  Boost 10 mg today (Tues)  Will increase dose and check in 2 weeks

## 2025-01-21 ENCOUNTER — ANTICOAGULATION - WARFARIN VISIT (OUTPATIENT)
Dept: PHARMACY | Facility: CLINIC | Age: 80
End: 2025-01-21
Payer: MEDICARE

## 2025-01-21 DIAGNOSIS — I48.0 PAROXYSMAL ATRIAL FIBRILLATION (MULTI): ICD-10-CM

## 2025-01-21 DIAGNOSIS — Z95.2 AORTIC VALVE REPLACED: ICD-10-CM

## 2025-01-21 DIAGNOSIS — Z95.2 HISTORY OF PROSTHETIC HEART VALVE: Primary | ICD-10-CM

## 2025-01-21 LAB
POC INR: 2.6
POC PROTHROMBIN TIME: NORMAL

## 2025-01-21 PROCEDURE — 85610 PROTHROMBIN TIME: CPT | Mod: QW

## 2025-01-21 PROCEDURE — 99212 OFFICE O/P EST SF 10 MIN: CPT

## 2025-01-21 NOTE — PROGRESS NOTES
No bleeding or unusual bruising.  Medications and doses verified.  Pt is still drinking 1/2 bottle of Boost each day.  No scheduled procedures at this time.  INR=2.6   Plan: Continue same weekly dose.  Follow up INR check in 4 weeks.

## 2025-02-18 ENCOUNTER — ANTICOAGULATION - WARFARIN VISIT (OUTPATIENT)
Dept: PHARMACY | Facility: CLINIC | Age: 80
End: 2025-02-18
Payer: MEDICARE

## 2025-02-18 DIAGNOSIS — Z95.2 HISTORY OF PROSTHETIC HEART VALVE: Primary | ICD-10-CM

## 2025-02-18 DIAGNOSIS — I48.0 PAROXYSMAL ATRIAL FIBRILLATION (MULTI): ICD-10-CM

## 2025-02-18 DIAGNOSIS — Z95.2 AORTIC VALVE REPLACED: ICD-10-CM

## 2025-02-18 LAB
POC INR: 2.2
POC PROTHROMBIN TIME: NORMAL

## 2025-02-18 PROCEDURE — 85610 PROTHROMBIN TIME: CPT | Mod: QW

## 2025-02-18 PROCEDURE — 99212 OFFICE O/P EST SF 10 MIN: CPT

## 2025-02-18 RX ORDER — WARFARIN SODIUM 5 MG/1
TABLET ORAL
Qty: 120 TABLET | Refills: 1 | Status: SHIPPED | OUTPATIENT
Start: 2025-02-18 | End: 2026-02-18

## 2025-02-18 NOTE — PROGRESS NOTES
No bleeding or unusual bruising.  Medications and doses verified.  No scheduled procedures at this time.  INR=2.2   Pt cut back on 1/2 bottle of Boost every other day now.  Plan: Boost dose today and weekly dose increased by 5.9% (1/2 tab)  Follow up INR check in 2 weeks.

## 2025-03-04 ENCOUNTER — ANTICOAGULATION - WARFARIN VISIT (OUTPATIENT)
Dept: PHARMACY | Facility: CLINIC | Age: 80
End: 2025-03-04
Payer: MEDICARE

## 2025-03-04 DIAGNOSIS — Z95.2 HISTORY OF PROSTHETIC HEART VALVE: Primary | ICD-10-CM

## 2025-03-04 DIAGNOSIS — I48.0 PAROXYSMAL ATRIAL FIBRILLATION (MULTI): ICD-10-CM

## 2025-03-04 DIAGNOSIS — Z95.2 AORTIC VALVE REPLACED: ICD-10-CM

## 2025-03-04 LAB
POC INR: 2.6
POC PROTHROMBIN TIME: NORMAL

## 2025-03-04 PROCEDURE — 99212 OFFICE O/P EST SF 10 MIN: CPT | Performed by: PHARMACIST

## 2025-03-04 PROCEDURE — 85610 PROTHROMBIN TIME: CPT | Mod: QW | Performed by: PHARMACIST

## 2025-03-04 NOTE — PROGRESS NOTES
Verified current dose with pt.    No new meds or med changes since last visit.  Pt denies unusual bleed/bruise.  No upcoming procedures.  No missed doses  No dietary changes - pt is still drinking 1/2 bottle of Boost every other day  Inr = 2.6  Continue same dose and check again in 4 weeks.

## 2025-04-01 ENCOUNTER — ANTICOAGULATION - WARFARIN VISIT (OUTPATIENT)
Dept: PHARMACY | Facility: CLINIC | Age: 80
End: 2025-04-01
Payer: MEDICARE

## 2025-04-01 DIAGNOSIS — Z95.2 AORTIC VALVE REPLACED: ICD-10-CM

## 2025-04-01 DIAGNOSIS — I48.0 PAROXYSMAL ATRIAL FIBRILLATION (MULTI): ICD-10-CM

## 2025-04-01 DIAGNOSIS — Z95.2 HISTORY OF PROSTHETIC HEART VALVE: Primary | ICD-10-CM

## 2025-04-01 LAB
POC INR: 3.1
POC PROTHROMBIN TIME: NORMAL

## 2025-04-01 PROCEDURE — 85610 PROTHROMBIN TIME: CPT | Mod: QW

## 2025-04-01 PROCEDURE — 99212 OFFICE O/P EST SF 10 MIN: CPT

## 2025-04-01 NOTE — PROGRESS NOTES
No bleeding or unusual bruising.  Medications and doses verified.  No scheduled procedures at this time.  INR=3.1   Plan: Continue same weekly dose.  Follow up INR check in 4 weeks.

## 2025-04-29 ENCOUNTER — APPOINTMENT (OUTPATIENT)
Dept: PHARMACY | Facility: CLINIC | Age: 80
End: 2025-04-29
Payer: MEDICARE

## 2025-05-05 ENCOUNTER — ANTICOAGULATION - WARFARIN VISIT (OUTPATIENT)
Dept: PHARMACY | Facility: CLINIC | Age: 80
End: 2025-05-05
Payer: MEDICARE

## 2025-05-05 DIAGNOSIS — Z95.2 HISTORY OF PROSTHETIC HEART VALVE: Primary | ICD-10-CM

## 2025-05-05 DIAGNOSIS — Z95.2 AORTIC VALVE REPLACED: ICD-10-CM

## 2025-05-05 DIAGNOSIS — I48.0 PAROXYSMAL ATRIAL FIBRILLATION (MULTI): ICD-10-CM

## 2025-05-05 LAB
POC INR: 3.7 (ref 0.9–1.1)
POC PROTHROMBIN TIME: ABNORMAL (ref 9.3–12.5)

## 2025-05-05 PROCEDURE — 85610 PROTHROMBIN TIME: CPT | Mod: QW

## 2025-05-05 PROCEDURE — 99212 OFFICE O/P EST SF 10 MIN: CPT

## 2025-05-05 NOTE — PROGRESS NOTES
No bleeding or unusual bruising.  Medications and doses verified.  No scheduled procedures at this time.  INR=3.7  Pt drinks 1/2 bottle Boost every other day. Did not have it today yet.   Plan: Continue same weekly dose b/c within 10% of goal range.  Follow up INR check in 4 weeks.

## 2025-06-02 ENCOUNTER — ANTICOAGULATION - WARFARIN VISIT (OUTPATIENT)
Dept: PHARMACY | Facility: CLINIC | Age: 80
End: 2025-06-02
Payer: MEDICARE

## 2025-06-02 DIAGNOSIS — Z95.2 HISTORY OF PROSTHETIC HEART VALVE: Primary | ICD-10-CM

## 2025-06-02 DIAGNOSIS — Z95.2 AORTIC VALVE REPLACED: ICD-10-CM

## 2025-06-02 DIAGNOSIS — I48.0 PAROXYSMAL ATRIAL FIBRILLATION (MULTI): ICD-10-CM

## 2025-06-02 LAB
POC INR: 3.9 (ref 0.9–1.1)
POC PROTHROMBIN TIME: ABNORMAL (ref 9.3–12.5)

## 2025-06-02 PROCEDURE — 99212 OFFICE O/P EST SF 10 MIN: CPT | Performed by: PHARMACIST

## 2025-06-02 PROCEDURE — 85610 PROTHROMBIN TIME: CPT | Mod: QW | Performed by: PHARMACIST

## 2025-06-02 NOTE — PROGRESS NOTES
Robinson Helton is a 79 y.o. male with history of history of prosthetic heart valve (aortic) and atrial fibrillation who presents today for anticoagulation monitoring and adjustment.  INR 3.9 is supra-therapeutic for this patient (goal range 2.5-3.5) and is reflective of 45 mg TWD  Patient verifies current dosing regimen, patient able to verbally recall dose  Patient reports 0  missed doses since last INR  Last INR 3.7 on 5/5/25 (4 week interval)  Patient denies s/sx clotting and/or stroke  Patient denies hematuria, epistaxis, rectal bleeding  Patient denies changes in diet, alcohol, or tobacco use  Vegetable intake consistent from week to week  Reviewed medication list and drug allergies with patient, updated any medication additions or modifications accordingly  Acetaminophen intake: no changes   Patient also denies any pending medical or dental procedures scheduled at this time  Patient was instructed to HOLD warfarin today only, then decrease to 42.5mg TWD (5.6% TWD) and RTC 3 weeks due to first dose change being next week     Lindsay Gillis, EstelleD, BCPS   6/2/2025 11:01 AM

## 2025-06-23 ENCOUNTER — ANTICOAGULATION - WARFARIN VISIT (OUTPATIENT)
Dept: PHARMACY | Facility: CLINIC | Age: 80
End: 2025-06-23
Payer: MEDICARE

## 2025-06-23 DIAGNOSIS — I48.0 PAROXYSMAL ATRIAL FIBRILLATION (MULTI): ICD-10-CM

## 2025-06-23 DIAGNOSIS — Z95.2 AORTIC VALVE REPLACED: ICD-10-CM

## 2025-06-23 DIAGNOSIS — Z95.2 HISTORY OF PROSTHETIC HEART VALVE: Primary | ICD-10-CM

## 2025-06-23 LAB
POC INR: 2.5 (ref 0.9–1.1)
POC INR: 2.5 (ref 0.9–1.1)
POC PROTHROMBIN TIME: ABNORMAL (ref 9.3–12.5)
POC PROTHROMBIN TIME: ABNORMAL (ref 9.3–12.5)

## 2025-06-23 PROCEDURE — 85610 PROTHROMBIN TIME: CPT | Mod: QW

## 2025-06-23 PROCEDURE — 99212 OFFICE O/P EST SF 10 MIN: CPT

## 2025-06-23 NOTE — PROGRESS NOTES
No bleeding or unusual bruising.  Medications and doses verified.  No scheduled procedures at this time.  INR=2.5   Plan: Weekly dose increased by 5.9% (1/2 tab) to prior dose because a big drop in INR.  Follow up INR check in 4 weeks.

## 2025-07-16 DIAGNOSIS — R31.0 GROSS HEMATURIA: ICD-10-CM

## 2025-07-21 ENCOUNTER — ANTICOAGULATION - WARFARIN VISIT (OUTPATIENT)
Dept: PHARMACY | Facility: CLINIC | Age: 80
End: 2025-07-21
Payer: MEDICARE

## 2025-07-21 DIAGNOSIS — Z95.2 HISTORY OF PROSTHETIC HEART VALVE: Primary | ICD-10-CM

## 2025-07-21 DIAGNOSIS — I48.0 PAROXYSMAL ATRIAL FIBRILLATION (MULTI): ICD-10-CM

## 2025-07-21 DIAGNOSIS — Z95.2 AORTIC VALVE REPLACED: ICD-10-CM

## 2025-07-21 LAB
POC INR: 6.4 (ref 0.9–1.1)
POC PROTHROMBIN TIME: ABNORMAL (ref 9.3–12.5)

## 2025-07-21 PROCEDURE — 85610 PROTHROMBIN TIME: CPT | Mod: QW | Performed by: PHARMACIST

## 2025-07-21 PROCEDURE — 99212 OFFICE O/P EST SF 10 MIN: CPT | Performed by: PHARMACIST

## 2025-07-21 NOTE — PROGRESS NOTES
Robinson Helton is a 80 y.o. male with history of prosthetic heart valve (aortic) and atrial fibrillation who presents today for anticoagulation monitoring and adjustment.  INR 6.8 is supra-therapeutic for this patient (goal range 2.5-3.5) and is reflective of 45 mg TWD  Patient verifies current dosing regimen, patient able to verbally recall dose   Patient reports 0  missed doses since last INR  Last INR 2.5 on 6/23/25 (4 week interval)  Patient denies s/sx clotting and/or stroke  Patient denies hematuria, epistaxis, rectal bleeding  Patient denies changes in diet, alcohol, or tobacco use  Vegetable intake consistent from week to week - maybe a little less greens than usual over the past week   Reviewed medication list and drug allergies with patient, updated any medication additions or modifications accordingly - increased glimepiride to 4mg PO daily   Acetaminophen intake: no changes   Patient also denies any pending medical or dental procedures scheduled at this time - CT coming up - not stopping warfarin // prior to CT     Patient stated he is little stressed regarding his upcoming CT scan, may be reason for high INR.     Patient was instructed to HOLD warfarin x 3 days - started 7/22/25, as patient already took their dose today -  and next POC INR on Friday.     Lindsay Gillis, PharmD, BCPS   7/21/2025 10:40 AM

## 2025-07-25 ENCOUNTER — ANTICOAGULATION - WARFARIN VISIT (OUTPATIENT)
Dept: PHARMACY | Facility: CLINIC | Age: 80
End: 2025-07-25
Payer: MEDICARE

## 2025-07-25 DIAGNOSIS — Z95.2 HISTORY OF PROSTHETIC HEART VALVE: Primary | ICD-10-CM

## 2025-07-25 DIAGNOSIS — I48.0 PAROXYSMAL ATRIAL FIBRILLATION (MULTI): ICD-10-CM

## 2025-07-25 DIAGNOSIS — Z95.2 AORTIC VALVE REPLACED: ICD-10-CM

## 2025-07-25 LAB
ANION GAP SERPL CALCULATED.4IONS-SCNC: 7 MMOL/L (CALC) (ref 7–17)
BUN SERPL-MCNC: 24 MG/DL (ref 7–25)
BUN/CREAT SERPL: ABNORMAL (CALC) (ref 6–22)
CALCIUM SERPL-MCNC: 9.4 MG/DL (ref 8.6–10.3)
CHLORIDE SERPL-SCNC: 108 MMOL/L (ref 98–110)
CO2 SERPL-SCNC: 26 MMOL/L (ref 20–32)
CREAT SERPL-MCNC: 0.97 MG/DL (ref 0.7–1.28)
EGFRCR SERPLBLD CKD-EPI 2021: 80 ML/MIN/1.73M2
GLUCOSE SERPL-MCNC: 153 MG/DL (ref 65–99)
POC INR: 1.6 (ref 0.9–1.1)
POC PROTHROMBIN TIME: ABNORMAL (ref 9.3–12.5)
POTASSIUM SERPL-SCNC: 5 MMOL/L (ref 3.5–5.3)
SODIUM SERPL-SCNC: 141 MMOL/L (ref 135–146)

## 2025-07-25 PROCEDURE — 85610 PROTHROMBIN TIME: CPT | Mod: QW

## 2025-07-25 NOTE — PROGRESS NOTES
Coumadin Clinic Visit Note    Patient verified warfarin dose  No missed doses  No unusual bruising or bleeding  No changes to medications  Consistent dietary green intake  No anticipated procedures at this time  INR Subtherapeutic today at 1.6, patient was here 4 days ago and INR was 6.4 , no smoking ,no alcohol , no diarrhea , no vomitting , no nausea , no otc supplemements , no herbal supplements , no antibiotic , patient used to have 1/2 can boost everyother days for few month now and he said he will stop that completely now so expecting INR to go higher since will stop it , however his INR range is 2.5-3.5 so will reduce dose  slightly ,might need to reduce further the dose 42.5 mg twd now , beginning of year he was at 40 mg twd ,   Will not boost since was high 3 days ago , resume dose today and reduce since stoppngthe ana luisa   Per previous note glimipiride dose was increased , stress because of upcoming scan , or less boost , may be all factor for high INR ?  Next appointment 1.5 week       Margoth Juárez, EstelleD

## 2025-07-28 ENCOUNTER — OFFICE VISIT (OUTPATIENT)
Dept: HEMATOLOGY/ONCOLOGY | Facility: CLINIC | Age: 80
End: 2025-07-28
Payer: MEDICARE

## 2025-07-28 ENCOUNTER — LAB (OUTPATIENT)
Dept: LAB | Facility: CLINIC | Age: 80
End: 2025-07-28
Payer: MEDICARE

## 2025-07-28 VITALS
TEMPERATURE: 97.5 F | HEART RATE: 64 BPM | SYSTOLIC BLOOD PRESSURE: 135 MMHG | WEIGHT: 189.6 LBS | BODY MASS INDEX: 28 KG/M2 | RESPIRATION RATE: 20 BRPM | DIASTOLIC BLOOD PRESSURE: 67 MMHG

## 2025-07-28 DIAGNOSIS — C49.3: Primary | ICD-10-CM

## 2025-07-28 DIAGNOSIS — C49.3: ICD-10-CM

## 2025-07-28 LAB
ALBUMIN SERPL BCP-MCNC: 4.4 G/DL (ref 3.4–5)
ALP SERPL-CCNC: 34 U/L (ref 33–136)
ALT SERPL W P-5'-P-CCNC: 21 U/L (ref 10–52)
ANION GAP SERPL CALC-SCNC: 10 MMOL/L (ref 10–20)
AST SERPL W P-5'-P-CCNC: 24 U/L (ref 9–39)
BASOPHILS # BLD AUTO: 0.05 X10*3/UL (ref 0–0.1)
BASOPHILS NFR BLD AUTO: 0.9 %
BILIRUB SERPL-MCNC: 0.7 MG/DL (ref 0–1.2)
BUN SERPL-MCNC: 29 MG/DL (ref 6–23)
CALCIUM SERPL-MCNC: 9 MG/DL (ref 8.6–10.3)
CHLORIDE SERPL-SCNC: 104 MMOL/L (ref 98–107)
CO2 SERPL-SCNC: 28 MMOL/L (ref 21–32)
CREAT SERPL-MCNC: 1.21 MG/DL (ref 0.5–1.3)
EGFRCR SERPLBLD CKD-EPI 2021: 61 ML/MIN/1.73M*2
EOSINOPHIL # BLD AUTO: 0.12 X10*3/UL (ref 0–0.4)
EOSINOPHIL NFR BLD AUTO: 2.1 %
ERYTHROCYTE [DISTWIDTH] IN BLOOD BY AUTOMATED COUNT: 12.7 % (ref 11.5–14.5)
GLUCOSE SERPL-MCNC: 147 MG/DL (ref 74–99)
HCT VFR BLD AUTO: 44.7 % (ref 41–52)
HGB BLD-MCNC: 15.1 G/DL (ref 13.5–17.5)
IMM GRANULOCYTES # BLD AUTO: 0.03 X10*3/UL (ref 0–0.5)
IMM GRANULOCYTES NFR BLD AUTO: 0.5 % (ref 0–0.9)
LDH SERPL L TO P-CCNC: 157 U/L (ref 84–246)
LYMPHOCYTES # BLD AUTO: 2.09 X10*3/UL (ref 0.8–3)
LYMPHOCYTES NFR BLD AUTO: 36.9 %
MCH RBC QN AUTO: 32.7 PG (ref 26–34)
MCHC RBC AUTO-ENTMCNC: 33.8 G/DL (ref 32–36)
MCV RBC AUTO: 97 FL (ref 80–100)
MONOCYTES # BLD AUTO: 0.44 X10*3/UL (ref 0.05–0.8)
MONOCYTES NFR BLD AUTO: 7.8 %
NEUTROPHILS # BLD AUTO: 2.94 X10*3/UL (ref 1.6–5.5)
NEUTROPHILS NFR BLD AUTO: 51.8 %
NRBC BLD-RTO: 0 /100 WBCS (ref 0–0)
PLATELET # BLD AUTO: 217 X10*3/UL (ref 150–450)
POTASSIUM SERPL-SCNC: 4.4 MMOL/L (ref 3.5–5.3)
PROT SERPL-MCNC: 6.8 G/DL (ref 6.4–8.2)
RBC # BLD AUTO: 4.62 X10*6/UL (ref 4.5–5.9)
SODIUM SERPL-SCNC: 138 MMOL/L (ref 136–145)
WBC # BLD AUTO: 5.7 X10*3/UL (ref 4.4–11.3)

## 2025-07-28 PROCEDURE — 85025 COMPLETE CBC W/AUTO DIFF WBC: CPT

## 2025-07-28 PROCEDURE — 1126F AMNT PAIN NOTED NONE PRSNT: CPT | Performed by: INTERNAL MEDICINE

## 2025-07-28 PROCEDURE — 1159F MED LIST DOCD IN RCRD: CPT | Performed by: INTERNAL MEDICINE

## 2025-07-28 PROCEDURE — 99213 OFFICE O/P EST LOW 20 MIN: CPT | Performed by: INTERNAL MEDICINE

## 2025-07-28 PROCEDURE — 36415 COLL VENOUS BLD VENIPUNCTURE: CPT

## 2025-07-28 PROCEDURE — 83615 LACTATE (LD) (LDH) ENZYME: CPT

## 2025-07-28 PROCEDURE — 80053 COMPREHEN METABOLIC PANEL: CPT

## 2025-07-28 ASSESSMENT — PAIN SCALES - GENERAL: PAINLEVEL_OUTOF10: 0-NO PAIN

## 2025-07-28 NOTE — PROGRESS NOTES
LOCATION:  [facility] at [facility].    HEMATOLOGY ONCOLOGY PROBLEMS:    1.  History of liposarcoma involving the left  chest wall.      a.  Status post surgical resection by Dr. Declan Hernandez at [facility] in Aug 2012.      b.  Subsequent resection of a residual 11 cm sarcoma by Dr. Stoney Gray at [facility].       c.  No history of chemotherapy or radiation therapy.      d.  Currently being followed with close observation.    CHIEF COMPLAINT:     The patient is in the clinic for management of liposarcoma involving the left upper chest wall.    HISTORY:    Mr. 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  The patient has a history of CABG and aorta reconstruction and the aortic valve replacement surgery in 2003.  He was having  regular followup, and during one of the scans, was noted to have a chest wall lesion around 2012.  He had a surgical resection done initially at [facility] by Dr. Declan Hernandez in August 2012 with pathology results confirming liposarcoma.  I do  not have all the details, but as per the patient, there was a finding of a residual, large, 11 cm lesion in the latissimus dorsi muscle.  He subsequently had an additional surgery done by Dr. Stoney Gray at [facility] with removal of 2 ribs  and a portion of the diaphragm and the latissimus dorsi muscle and other tissue.  He never received adjuvant radiation or chemotherapy, and since then, has been followed closely without any evidence of disease recurrence.  Over the years, he has developed  a large incisional/upper abdomen/flank hernia.  As per the patient, he has been advised close symptomatic followup.  He uses a brace regularly.    INTERVAL HISTORY:    He denies any specific new complaints.  No specific history of nausea, vomiting, fever, diarrhea, rash,  anorexia, or weight loss.     PAST MEDICAL HISTORY:    1.   Coronary artery disease.  2.  Hypertension.  3.  History  of CABG/aortic valve replacement / aortic root reconstruction surgery in .  4.  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   5.  Anxiety/depression.  6.  Diabetes Mellitus type II.  7.  Remote history of tonsillectomy.    SOCIAL HISTORY:    Single and lives alone in [place].  Quit smoking 35 years ago.  Quit  drinking alcohol in .  He is a retired  and used to work for [employer].  Born and raised in [place].    FAMILY HISTORY:     Father  at age 65 from myocardial infarction.  Mother  at age 46 from throat cancer.  The sister  at age 67 from ovarian cancer. He doesn't have any children.     REVIEW OF SYSTEMS:    Pertinent finding as per the history above.   All other systems have been reviewed and generally negative and noncontributory.                                                                     ALLERGY & MEDICATIONS:  Allergies and latest outpatient medications list were reviewed in the EMR.    VITAL SIGNS  BSA: 2.05 meters squared  /67   Pulse 64   Temp 36.4 °C (97.5 °F)   Resp 20   Wt 86 kg (189 lb 9.5 oz)   BMI 28.00 kg/m²     PHYSICAL EXAMINATION:  Detailed examination not done.  Large left-sided hernia as before.    LAB RESULTS:  CBC, CMP and LDH were all normal today other than glucose of 147 and Cr of 1.2.  Last 3 sets of blood work were reviewed and the trend was noted.     ASSESSMENT & PLAN:  1.  History of localized chest wall liposarcoma. Please refer to the details of initial presentation and management as outlined above. In summary, the patient was noted to have  an incidental finding of a chest wall lesion. He had an initial resection done at [place] but had extensive additional surgery done at [facility] in . There was no need for a adjuvant radiation or chemotherapy, and since then, he has been followed  closely without any evidence of recurrence. Over the years, he  has developed a large left flank hernia extending almost up to the lower axilla.  He is 12 years out from his initial diagnosis, and there is no need for regular followup CT scans at this time.  In the future, if there are any new symptoms or concerns we will check the scans at that time.    His blood work is essentially unremarkable. Patient is reassured that everything is stable. We will continue to follow him closely with regular physical examination  and blood work.    2.  Left flank hernia.  As stated in the previous notes, on physical examination, the patient has a large hernia. As per the patient, the finding has not changed over the years. As before, I am slightly concerned at the size and extent of the hernia. In my opinion, there will be a concern for strangulation or volvulus  or similar complications.  I again advised him to have surgical reevaluation but he is reluctant. As per the patient he is used to this large swelling and he has not noticed any significant change over last 10 years and wants to avoid another big  surgery.  Today he was finally agreeable to have at least initial evaluation with Dr. Dawn and we will request his help.    3.  Followup. He will have a followup visit in 12 months. We will check labs (CBC/ CMP)  few days prior to next visit.     This note has been transcribed using Dragon voice recognition system and there is a possibility of unintentional typing misprints.

## 2025-08-04 ENCOUNTER — HOSPITAL ENCOUNTER (OUTPATIENT)
Dept: RADIOLOGY | Facility: HOSPITAL | Age: 80
Discharge: HOME | End: 2025-08-04
Payer: MEDICARE

## 2025-08-04 DIAGNOSIS — R31.0 GROSS HEMATURIA: ICD-10-CM

## 2025-08-04 PROCEDURE — 74170 CT ABD WO CNTRST FLWD CNTRST: CPT

## 2025-08-04 PROCEDURE — 74170 CT ABD WO CNTRST FLWD CNTRST: CPT | Performed by: STUDENT IN AN ORGANIZED HEALTH CARE EDUCATION/TRAINING PROGRAM

## 2025-08-04 PROCEDURE — 2550000001 HC RX 255 CONTRASTS: Performed by: STUDENT IN AN ORGANIZED HEALTH CARE EDUCATION/TRAINING PROGRAM

## 2025-08-04 RX ADMIN — IOHEXOL 75 ML: 350 INJECTION, SOLUTION INTRAVENOUS at 13:17

## 2025-08-05 ENCOUNTER — ANTICOAGULATION - WARFARIN VISIT (OUTPATIENT)
Dept: PHARMACY | Facility: CLINIC | Age: 80
End: 2025-08-05
Payer: MEDICARE

## 2025-08-05 DIAGNOSIS — I48.0 PAROXYSMAL ATRIAL FIBRILLATION (MULTI): ICD-10-CM

## 2025-08-05 DIAGNOSIS — Z95.2 AORTIC VALVE REPLACED: ICD-10-CM

## 2025-08-05 DIAGNOSIS — Z95.2 HISTORY OF PROSTHETIC HEART VALVE: Primary | ICD-10-CM

## 2025-08-05 LAB
POC INR: 4.9 (ref 0.9–1.1)
POC PROTHROMBIN TIME: ABNORMAL (ref 9.3–12.5)

## 2025-08-05 PROCEDURE — 85610 PROTHROMBIN TIME: CPT | Mod: QW

## 2025-08-05 PROCEDURE — 99212 OFFICE O/P EST SF 10 MIN: CPT

## 2025-08-05 NOTE — PROGRESS NOTES
Robinson Helton is a 80 y.o. male with history of history of prosthetic heart valve, atrial fibrillation, aortic valve replaced, who presents today for anticoagulation monitoring and adjustment.  INR 4.9 is supra-therapeutic for this patient (goal range 2.5-3.5) and is reflective of 42.5 mg TWD  Patient verifies current dosing regimen, patient able to verbally recall dose  Patient reports 0  missed doses since last INR   Last INR 1.6 on 7/25/25 (1.5 week interval)  Patient denies s/sx clotting and/or stroke  Patient denies hematuria, epistaxis, rectal bleeding  Patient denies changes in diet, alcohol, or tobacco use -Patient is no longer drinking his BOOST   Vegetable intake consistent from week to week  Reviewed medication list and drug allergies with patient, updated any medication additions or modifications accordingly  Acetaminophen intake: no changes   Patient also denies any pending medical or dental procedures scheduled at this time  Patient was instructed to HOLD warfarin tomorrow only (already took dose today), then decrease TWD to 40mg (6% decrease in TWD) and RTC 2 weeks        Lindsay Gillis, EstelleD, BCPS   8/5/2025 10:28 AM

## 2025-08-15 ENCOUNTER — APPOINTMENT (OUTPATIENT)
Dept: UROLOGY | Facility: CLINIC | Age: 80
End: 2025-08-15
Payer: MEDICARE

## 2025-08-15 VITALS
WEIGHT: 193.8 LBS | TEMPERATURE: 98 F | DIASTOLIC BLOOD PRESSURE: 66 MMHG | BODY MASS INDEX: 28.62 KG/M2 | HEART RATE: 61 BPM | SYSTOLIC BLOOD PRESSURE: 116 MMHG

## 2025-08-15 DIAGNOSIS — N28.1 RENAL CYST: ICD-10-CM

## 2025-08-15 PROCEDURE — 1159F MED LIST DOCD IN RCRD: CPT | Performed by: STUDENT IN AN ORGANIZED HEALTH CARE EDUCATION/TRAINING PROGRAM

## 2025-08-15 PROCEDURE — G2211 COMPLEX E/M VISIT ADD ON: HCPCS | Performed by: STUDENT IN AN ORGANIZED HEALTH CARE EDUCATION/TRAINING PROGRAM

## 2025-08-15 PROCEDURE — 99213 OFFICE O/P EST LOW 20 MIN: CPT | Performed by: STUDENT IN AN ORGANIZED HEALTH CARE EDUCATION/TRAINING PROGRAM

## 2025-08-15 ASSESSMENT — ENCOUNTER SYMPTOMS
OCCASIONAL FEELINGS OF UNSTEADINESS: 0
LOSS OF SENSATION IN FEET: 0
DEPRESSION: 0

## 2025-08-20 ENCOUNTER — APPOINTMENT (OUTPATIENT)
Dept: PHARMACY | Facility: CLINIC | Age: 80
End: 2025-08-20
Payer: MEDICARE

## 2025-08-21 ENCOUNTER — ANTICOAGULATION - WARFARIN VISIT (OUTPATIENT)
Dept: PHARMACY | Facility: CLINIC | Age: 80
End: 2025-08-21
Payer: MEDICARE

## 2025-08-21 DIAGNOSIS — I48.0 PAROXYSMAL ATRIAL FIBRILLATION (MULTI): ICD-10-CM

## 2025-08-21 DIAGNOSIS — Z95.2 HISTORY OF PROSTHETIC HEART VALVE: Primary | ICD-10-CM

## 2025-08-21 DIAGNOSIS — Z95.2 AORTIC VALVE REPLACED: ICD-10-CM

## 2025-08-21 LAB
POC INR: 3.9 (ref 0.9–1.1)
POC PROTHROMBIN TIME: ABNORMAL (ref 9.3–12.5)

## 2025-08-21 PROCEDURE — 85610 PROTHROMBIN TIME: CPT | Mod: QW

## 2025-08-21 PROCEDURE — 99212 OFFICE O/P EST SF 10 MIN: CPT

## 2026-08-21 ENCOUNTER — APPOINTMENT (OUTPATIENT)
Age: 81
End: 2026-08-21
Payer: MEDICARE